# Patient Record
Sex: MALE | Race: WHITE | NOT HISPANIC OR LATINO | URBAN - METROPOLITAN AREA
[De-identification: names, ages, dates, MRNs, and addresses within clinical notes are randomized per-mention and may not be internally consistent; named-entity substitution may affect disease eponyms.]

---

## 2018-06-29 ENCOUNTER — APPOINTMENT (RX ONLY)
Dept: URBAN - METROPOLITAN AREA CLINIC 142 | Facility: CLINIC | Age: 58
Setting detail: DERMATOLOGY
End: 2018-06-29

## 2018-06-29 DIAGNOSIS — L57.0 ACTINIC KERATOSIS: ICD-10-CM

## 2018-06-29 DIAGNOSIS — L85.3 XEROSIS CUTIS: ICD-10-CM

## 2018-06-29 DIAGNOSIS — B07.0 PLANTAR WART: ICD-10-CM

## 2018-06-29 PROCEDURE — ? DEFER

## 2018-06-29 PROCEDURE — ? COUNSELING

## 2018-06-29 PROCEDURE — 99213 OFFICE O/P EST LOW 20 MIN: CPT

## 2018-06-29 ASSESSMENT — LOCATION ZONE DERM
LOCATION ZONE: FEET
LOCATION ZONE: ARM
LOCATION ZONE: SCALP

## 2018-06-29 ASSESSMENT — LOCATION DETAILED DESCRIPTION DERM
LOCATION DETAILED: RIGHT PLANTAR FOREFOOT OVERLYING 3RD METATARSAL
LOCATION DETAILED: LEFT DISTAL DORSAL FOREARM
LOCATION DETAILED: LEFT SUPERIOR PARIETAL SCALP

## 2018-06-29 ASSESSMENT — LOCATION SIMPLE DESCRIPTION DERM
LOCATION SIMPLE: SCALP
LOCATION SIMPLE: RIGHT PLANTAR SURFACE
LOCATION SIMPLE: LEFT FOREARM

## 2020-03-10 ENCOUNTER — APPOINTMENT (RX ONLY)
Dept: URBAN - METROPOLITAN AREA CLINIC 139 | Facility: CLINIC | Age: 60
Setting detail: DERMATOLOGY
End: 2020-03-10

## 2020-03-10 DIAGNOSIS — L72.8 OTHER FOLLICULAR CYSTS OF THE SKIN AND SUBCUTANEOUS TISSUE: ICD-10-CM

## 2020-03-10 PROCEDURE — 12032 INTMD RPR S/A/T/EXT 2.6-7.5: CPT

## 2020-03-10 PROCEDURE — ? EXCISION

## 2020-03-10 PROCEDURE — 11402 EXC TR-EXT B9+MARG 1.1-2 CM: CPT

## 2020-03-10 ASSESSMENT — LOCATION DETAILED DESCRIPTION DERM: LOCATION DETAILED: LEFT MEDIAL UPPER BACK

## 2020-03-10 ASSESSMENT — LOCATION SIMPLE DESCRIPTION DERM: LOCATION SIMPLE: LEFT UPPER BACK

## 2020-03-10 ASSESSMENT — LOCATION ZONE DERM: LOCATION ZONE: TRUNK

## 2021-02-13 ENCOUNTER — HOSPITAL ENCOUNTER (EMERGENCY)
Dept: HOSPITAL 67 - ED | Age: 61
Discharge: HOME | End: 2021-02-13
Payer: COMMERCIAL

## 2021-02-13 VITALS — WEIGHT: 180 LBS | BODY MASS INDEX: 26.66 KG/M2 | HEIGHT: 69 IN

## 2021-02-13 VITALS — TEMPERATURE: 98.7 F

## 2021-02-13 VITALS — SYSTOLIC BLOOD PRESSURE: 144 MMHG | DIASTOLIC BLOOD PRESSURE: 89 MMHG

## 2021-02-13 DIAGNOSIS — G89.29: ICD-10-CM
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
## 2021-02-13 DIAGNOSIS — S39.012A: ICD-10-CM
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
## 2021-02-13 DIAGNOSIS — M54.5: Primary | ICD-10-CM

## 2021-02-13 DIAGNOSIS — M62.830: ICD-10-CM

## 2021-02-13 LAB
APTT BLD: 26.3 SECONDS (ref 24.5–33.6)
PLATELET # BLD: 195 K/UL (ref 142–355)
POTASSIUM SERPL-SCNC: 3.8 MMOL/L (ref 3.6–5.2)
SODIUM SERPL-SCNC: 141 MMOL/L (ref 136–145)

## 2021-02-13 PROCEDURE — 85610 PROTHROMBIN TIME: CPT

## 2021-02-13 PROCEDURE — 80053 COMPREHEN METABOLIC PANEL: CPT

## 2021-02-13 PROCEDURE — 85027 COMPLETE CBC AUTOMATED: CPT

## 2021-02-13 PROCEDURE — 96375 TX/PRO/DX INJ NEW DRUG ADDON: CPT

## 2021-02-13 PROCEDURE — 99284 EMERGENCY DEPT VISIT MOD MDM: CPT

## 2021-02-13 PROCEDURE — 96360 HYDRATION IV INFUSION INIT: CPT

## 2021-02-13 PROCEDURE — 81000 URINALYSIS NONAUTO W/SCOPE: CPT

## 2021-02-13 PROCEDURE — 85730 THROMBOPLASTIN TIME PARTIAL: CPT

## 2023-05-24 NOTE — PROCEDURE: EXCISION
No Repair - Repaired With Adjacent Surgical Defect Text (Leave Blank If You Do Not Want): After the excision the defect was repaired concurrently with another surgical defect which was in close approximation. Benzoyl Peroxide Pregnancy And Lactation Text: This medication is Pregnancy Category C. It is unknown if benzoyl peroxide is excreted in breast milk.

## 2023-08-02 NOTE — PROCEDURE: EXCISION
Anesthesia Pre Eval Note    Anesthesia ROS/Med Hx    Overall Review:  EKG was reviewed     Anesthetic Complication History:  Patient does not have a history of anesthetic complications      Pulmonary Review:  Patient does not have a pulmonary history      Neuro/Psych Review:  Patient does not have a neuro/psych history       Cardiovascular Review:    Positive for hypertension  Positive for hyperlipidemia    GI/HEPATIC/RENAL Review:  Patient does not have a GI/hepatic/renalhistory       End/Other Review:  Positive for diabetes - type 2    Overall Review of Systems Comments:  Diabetes mellitus type II, controlled (CMD)  Date Unknown  Elevated cholesterol  Date Unknown  History of pancreatitis  Date Unknown  Screen for colon cancer  Noted Date   Type 2 diabetes mellitus without complication, without long-term current use of insulin 08/22/2019  Dyslipidemia 08/22/2019  Abnormal mammogram 07/22/2013  History of pancreatitis       Additional Results:  EKG:  No results found for this or any previous visit (from the past 4464 hour(s)).    ALLERGIES:  No Known Allergies       Last Labs        Component                Value               Date/Time                  WBC                      3.1 (L)             02/04/2023 0959            RBC                      4.16                02/04/2023 0959            HGB                      12.7                02/04/2023 0959            HCT                      39.4                02/04/2023 0959            MCV                      94.7                02/04/2023 0959            MCH                      30.5                02/04/2023 0959            MCHC                     32.2                02/04/2023 0959            RDW-CV                   11.9                02/04/2023 0959            Sodium                   139                 02/04/2023 0959            Potassium                4.3                 02/04/2023 0959            Chloride                 108                 02/04/2023  0959            Carbon Dioxide           25                  02/04/2023 0959            Glucose                  97                  02/04/2023 0959            BUN                      23 (H)              02/04/2023 0959            Creatinine               0.75                02/04/2023 0959            Glomerular Filtrati*     89                  02/04/2023 0959            Calcium                  8.9                 02/04/2023 0959            PLT                      196                 02/04/2023 0959        Past Medical History:  No date: Diabetes mellitus type II, controlled (CMD)  No date: Elevated cholesterol  No date: History of pancreatitis  No date: Screen for colon cancer    Past Surgical History:  No date: Breast surgery  No date: Cholecystectomy       Prior to Admission medications :  Medication atorvastatin (LIPITOR) 20 MG tablet, Sig Take 1 tablet by mouth daily., Start Date 4/15/23, End Date , Taking? Yes, Authorizing Provider Hardeep Suresh MD    Medication lisinopril (ZESTRIL) 5 MG tablet, Sig Take 1 tablet by mouth daily., Start Date 4/15/23, End Date , Taking? Yes, Authorizing Provider Hardeep Suresh MD    Medication aspirin 81 MG EC tablet, Sig Take 1 tablet by mouth daily., Start Date 8/30/21, End Date , Taking? Yes, Authorizing Provider Rodrigo Curiel MD    Medication blood glucose (BLAKE CONTOUR NEXT TEST) test strip, Sig Test blood sugar once times daily. Diagnosis: e11.1. Meter: contour, Start Date 4/15/23, End Date , Taking? , Authorizing Provider Hardeep Suresh MD    Medication SOFTCLIX LANCETS Misc, Sig Use one lancet when checking blood glucose, Start Date 4/15/23, End Date , Taking? , Authorizing Provider Hardeep Suresh MD    Medication Lancets (accu-chek soft touch) Misc, Sig Use as instructed, Start Date 9/10/19, End Date , Taking? , Authorizing Provider Provider, Outside         Patient Vitals in the past 24 hrs:  08/02/23 1125, BP:104/75, Pulse:63, Resp:12, SpO2:100  %  08/02/23 1110, BP:108/64, Pulse:(!) 58, Resp:14, SpO2:100 %  08/02/23 1055, BP:94/56, Temp:36 °C (96.8 °F), Temp src:Temporal, Pulse:69, Resp:15  08/02/23 1018, Pulse:62, Resp:(!) 23 08/02/23 1017, Pulse:(!) 59, Resp:18 08/02/23 1016, Pulse:60, Resp:18 08/02/23 1015, Pulse:(!) 48, Resp:15  08/02/23 0953, BP:115/69, Temp:36.5 °C (97.7 °F), Temp src:Temporal, Pulse:(!) 55, Resp:(!) 9, SpO2:100 %, Height:5' 6\" (1.676 m), Weight:65.8 kg (145 lb)      Relevant Problems   No relevant active problems       Physical Exam     Airway   Mallampati: II  TM Distance: >3 FB  Neck ROM: Full  Neck: Non-tender and Able to place in sniff position  TMJ Mobility: Good    Cardiovascular  Cardiovascular exam normal  Cardio Rhythm: Regular  Cardio Rate: Normal    Head Assessment  Head assessment: Normocephalic and Atraumatic    General Assessment  General Assessment: Alert and oriented and No acute distress    Dental Exam  Dental exam normal    Pulmonary Exam  Pulmonary exam normal  Breath sounds clear to auscultation:  Yes    Abdominal Exam  Abdominal exam normal      Anesthesia Plan:    ASA Status: 2  Anesthesia Type: MAC    Induction: Intravenous  Preferred Airway Type: Nasal Cannula  Maintenance: TIVA  Premedication: None      Post-op Pain Management: Per Surgeon      Checklist  Reviewed: NPO Status, Allergies, Medications, Problem list, Past Med History, Lab Results, EKG, Consultations and Nursing Notes  Consent/Risks Discussed Statement:  The proposed anesthetic plan, including its risks and benefits, have been discussed with the Patient along with the risks and benefits of alternatives. Questions were encouraged and answered and the patient and/or representative understands and agrees to proceed.    I have discussed elements of the patient's history or examination, as noted above and/or as follows, that place the patient at higher risk of complications; age and heart disease.    I discussed with the patient (and/or patient's  legal representative) the risks and benefits of the proposed anesthesia plan, MAC, which may include services performed by other anesthesia providers.    Alternative anesthesia plans, if available, were reviewed with the patient (and/or patient's legal representative). Discussion has been held with the patient (and/or patient's legal representative) regarding risks of anesthesia, which include Nausea, Vomiting, Headache, Sore Throat, Dental Injury, Hypotension, Allergic Reaction, Need for Blood Transfusion, Post-op Intubation, ICU Admit, Nerve Injury, Aspiration, Intra-operative Awareness, Emergence Delirium, allergic reaction, anxiety, aspiration and conversion to general anesthesia and emergent situations that may require change in anesthesia plan.    The patient (and/or patient's legal representative) has indicated understanding, his/her questions have been answered, and he/she wishes to proceed with the planned anesthetic.      Informed Consent for Blood: Consented  Blood Products: Not Anticipated     Medical Necessity Information: It is in your best interest to select a reason for this procedure from the list below. All of these items fulfill various CMS LCD requirements except lesion extends to a margin.